# Patient Record
(demographics unavailable — no encounter records)

---

## 2025-01-13 NOTE — HISTORY OF PRESENT ILLNESS
[Patient reported PAP Smear was normal] : Patient reported PAP Smear was normal [Normal Amount/Duration] :  normal amount and duration [Patient refuses STI testing] : Patient refuses STI testing [FreeTextEntry1] : 21 y/o G 0 female, LMP: 1/2/25, presents for annual GYN visit.  Patient denies any GYN complaints.   Menstrual Cycle: regular, monthly, mild pain and bleeding. Sexual Activity: monogamous with BF. Contraception: OCP Social/Mental Health: reports social ETOH, denies tobacco or any illicit drug use.  Denies depression, anxiety, thoughts of personal harm or suicidal ideation.  ROS:  Denies fever/chills, HA, Cough/sore throat, CP, SOB, N/V, Diarrhea/Constipation, Pelvic pain, Urinary frequency/urgency/incontinence, irregular vaginal bleeding, discharge or irritation.    Medical History GYN HX: denies PMH: denies PSH: denies Meds: OCP Allergies: NKDA [PapSmeardate] : 11/23

## 2025-01-13 NOTE — PHYSICAL EXAM
[Chaperone Present] : A chaperone was present in the examining room during all aspects of the physical examination [[id number]] : A chaperone was present during the pelvic exam. [FreeTextEntry2] : Paty SMITH [Appropriately responsive] : appropriately responsive [Alert] : alert [No Acute Distress] : no acute distress [Soft] : soft [Non-tender] : non-tender [Non-distended] : non-distended [No Lesions] : no lesions [No Mass] : no mass [Oriented x3] : oriented x3 [Examination Of The Breasts] : a normal appearance [No Masses] : no breast masses were palpable [Labia Majora] : normal [Labia Minora] : normal [Normal] : normal [Uterine Adnexae] : normal